# Patient Record
Sex: FEMALE | Race: WHITE | NOT HISPANIC OR LATINO | Employment: OTHER | URBAN - METROPOLITAN AREA
[De-identification: names, ages, dates, MRNs, and addresses within clinical notes are randomized per-mention and may not be internally consistent; named-entity substitution may affect disease eponyms.]

---

## 2017-03-01 ENCOUNTER — HISTORICAL (OUTPATIENT)
Dept: ADMINISTRATIVE | Facility: HOSPITAL | Age: 62
End: 2017-03-01

## 2018-05-15 ENCOUNTER — HISTORICAL (OUTPATIENT)
Dept: ADMINISTRATIVE | Facility: HOSPITAL | Age: 63
End: 2018-05-15

## 2018-05-15 LAB
BUN SERPL-MCNC: 11 MG/DL (ref 7–18)
CALCIUM SERPL-MCNC: 8.8 MG/DL (ref 8.5–10.1)
CHLORIDE SERPL-SCNC: 107 MMOL/L (ref 98–107)
CHOLEST SERPL-MCNC: 150 MG/DL (ref 0–200)
CHOLEST/HDLC SERPL: 3.4 {RATIO} (ref 0–4)
CO2 SERPL-SCNC: 29 MMOL/L (ref 21–32)
CREAT SERPL-MCNC: 0.65 MG/DL (ref 0.55–1.02)
CREAT/UREA NIT SERPL: 16.9
GLUCOSE SERPL-MCNC: 157 MG/DL (ref 74–106)
HDLC SERPL-MCNC: 44 MG/DL (ref 35–60)
LDLC SERPL CALC-MCNC: 55 MG/DL (ref 0–129)
POTASSIUM SERPL-SCNC: 4 MMOL/L (ref 3.5–5.1)
SODIUM SERPL-SCNC: 140 MMOL/L (ref 136–145)
TRIGL SERPL-MCNC: 255 MG/DL (ref 30–150)
VLDLC SERPL CALC-MCNC: 51 MG/DL

## 2019-05-20 ENCOUNTER — HISTORICAL (OUTPATIENT)
Dept: ADMINISTRATIVE | Facility: HOSPITAL | Age: 64
End: 2019-05-20

## 2019-05-20 LAB
ALBUMIN SERPL-MCNC: 3.7 GM/DL (ref 3.4–5)
ALP SERPL-CCNC: 65 UNIT/L (ref 38–126)
ALT SERPL-CCNC: 24 UNIT/L (ref 12–78)
AST SERPL-CCNC: 13 UNIT/L (ref 15–37)
BILIRUB SERPL-MCNC: 0.7 MG/DL (ref 0.2–1)
BILIRUBIN DIRECT+TOT PNL SERPL-MCNC: 0.1 MG/DL (ref 0–0.2)
BILIRUBIN DIRECT+TOT PNL SERPL-MCNC: 0.6 MG/DL (ref 0–0.8)
CHOLEST SERPL-MCNC: 169 MG/DL (ref 0–200)
CHOLEST/HDLC SERPL: 3.3 {RATIO} (ref 0–4)
HDLC SERPL-MCNC: 51 MG/DL (ref 35–60)
LDLC SERPL CALC-MCNC: 51 MG/DL (ref 0–129)
LIVER PROFILE INTERP: ABNORMAL
PROT SERPL-MCNC: 7.3 GM/DL (ref 6.4–8.2)
TRIGL SERPL-MCNC: 336 MG/DL (ref 30–150)
VLDLC SERPL CALC-MCNC: 67 MG/DL

## 2019-07-02 ENCOUNTER — HISTORICAL (OUTPATIENT)
Dept: ADMINISTRATIVE | Facility: HOSPITAL | Age: 64
End: 2019-07-02

## 2019-07-02 LAB
ALBUMIN SERPL-MCNC: 3.8 GM/DL (ref 3.4–5)
ALP SERPL-CCNC: 67 UNIT/L (ref 38–126)
ALT SERPL-CCNC: 26 UNIT/L (ref 12–78)
AST SERPL-CCNC: 12 UNIT/L (ref 15–37)
BILIRUB SERPL-MCNC: 0.5 MG/DL (ref 0.2–1)
BILIRUBIN DIRECT+TOT PNL SERPL-MCNC: 0.2 MG/DL (ref 0–0.5)
BILIRUBIN DIRECT+TOT PNL SERPL-MCNC: 0.3 MG/DL (ref 0–0.8)
CHOLEST SERPL-MCNC: 160 MG/DL (ref 0–200)
CHOLEST/HDLC SERPL: 3.4 {RATIO} (ref 0–4)
HDLC SERPL-MCNC: 47 MG/DL (ref 35–60)
LDLC SERPL CALC-MCNC: 57 MG/DL (ref 0–129)
LIVER PROFILE INTERP: ABNORMAL
PROT SERPL-MCNC: 7.4 GM/DL (ref 6.4–8.2)
TRIGL SERPL-MCNC: 280 MG/DL (ref 30–150)
VLDLC SERPL CALC-MCNC: 56 MG/DL

## 2019-07-03 ENCOUNTER — HISTORICAL (OUTPATIENT)
Dept: RADIOLOGY | Facility: HOSPITAL | Age: 64
End: 2019-07-03

## 2019-07-24 LAB
ABS NEUT (OLG): 5.1 X10(3)/MCL (ref 1.5–6.9)
APPEARANCE, UA: CLEAR
APTT PPP: 25.6 SECOND(S) (ref 25–35)
BILIRUB UR QL STRIP: NEGATIVE
BUN SERPL-MCNC: 19 MG/DL (ref 10–20)
CALCIUM SERPL-MCNC: 9.1 MG/DL (ref 8–10.5)
CHLORIDE SERPL-SCNC: 104 MMOL/L (ref 100–108)
CO2 SERPL-SCNC: 25 MMOL/L (ref 21–35)
COLOR UR: NORMAL
CREAT SERPL-MCNC: 0.71 MG/DL (ref 0.7–1.3)
CREAT/UREA NIT SERPL: 27
CRP SERPL-MCNC: 0.2 MG/DL (ref 0–0.9)
ERYTHROCYTE [DISTWIDTH] IN BLOOD BY AUTOMATED COUNT: 13.1 % (ref 11.5–17)
ERYTHROCYTE [SEDIMENTATION RATE] IN BLOOD: 25 MM/HR (ref 0–20)
GLUCOSE (UA): NEGATIVE
GLUCOSE SERPL-MCNC: 116 MG/DL (ref 75–116)
HCT VFR BLD AUTO: 40.1 % (ref 36–48)
HGB BLD-MCNC: 13.7 GM/DL (ref 12–16)
HGB UR QL STRIP: NEGATIVE
INR PPP: 1 (ref 0–1.2)
KETONES UR QL STRIP: NEGATIVE
LEUKOCYTE ESTERASE UR QL STRIP: NEGATIVE
MCH RBC QN AUTO: 31 PG (ref 27–34)
MCHC RBC AUTO-ENTMCNC: 34 GM/DL (ref 31–36)
MCV RBC AUTO: 90 FL (ref 80–99)
NITRITE UR QL STRIP: NEGATIVE
PH UR STRIP: 5.5 [PH]
PLATELET # BLD AUTO: 230 X10(3)/MCL (ref 140–400)
PMV BLD AUTO: 9.5 FL (ref 6.8–10)
POTASSIUM SERPL-SCNC: 4.2 MMOL/L (ref 3.6–5.2)
PROT UR QL STRIP: NEGATIVE
PROTHROMBIN TIME: 9.9 SECOND(S) (ref 9–12)
RBC # BLD AUTO: 4.48 X10(6)/MCL (ref 4.2–5.4)
SODIUM SERPL-SCNC: 139 MMOL/L (ref 135–145)
SP GR UR STRIP: 1.02
UROBILINOGEN UR STRIP-ACNC: 0.2 EU/DL
WBC # SPEC AUTO: 8.8 X10(3)/MCL (ref 4.5–11.5)

## 2019-07-26 LAB — FINAL CULTURE: NORMAL

## 2019-07-30 LAB
CROSSMATCH INTERPRETATION: NORMAL
GROUP & RH: NORMAL
PRODUCT READY: NORMAL

## 2019-07-31 ENCOUNTER — HISTORICAL (OUTPATIENT)
Dept: MEDSURG UNIT | Facility: HOSPITAL | Age: 64
End: 2019-07-31

## 2019-08-01 LAB
HCT VFR BLD AUTO: 35.3 % (ref 36–48)
HGB BLD-MCNC: 11.8 GM/DL (ref 12–16)

## 2019-08-14 ENCOUNTER — HISTORICAL (OUTPATIENT)
Dept: RADIOLOGY | Facility: HOSPITAL | Age: 64
End: 2019-08-14

## 2020-01-22 ENCOUNTER — HISTORICAL (OUTPATIENT)
Dept: RADIOLOGY | Facility: HOSPITAL | Age: 65
End: 2020-01-22

## 2020-09-10 ENCOUNTER — HISTORICAL (OUTPATIENT)
Dept: ADMINISTRATIVE | Facility: HOSPITAL | Age: 65
End: 2020-09-10

## 2020-09-10 LAB
ALBUMIN SERPL-MCNC: 3.9 GM/DL (ref 3.4–4.8)
ALP SERPL-CCNC: 55 UNIT/L (ref 40–150)
ALT SERPL-CCNC: 37 UNIT/L (ref 0–55)
AST SERPL-CCNC: 22 UNIT/L (ref 5–34)
BILIRUB SERPL-MCNC: 0.5 MG/DL
BILIRUBIN DIRECT+TOT PNL SERPL-MCNC: 0.2 MG/DL (ref 0–0.5)
BILIRUBIN DIRECT+TOT PNL SERPL-MCNC: 0.3 MG/DL (ref 0–0.8)
CHOLEST SERPL-MCNC: 145 MG/DL
CHOLEST/HDLC SERPL: 3 {RATIO} (ref 0–5)
HDLC SERPL-MCNC: 44 MG/DL (ref 35–60)
LDLC SERPL CALC-MCNC: 63 MG/DL (ref 50–140)
LIVER PROFILE INTERP: NORMAL
PROT SERPL-MCNC: 6.9 GM/DL (ref 5.8–7.6)
TRIGL SERPL-MCNC: 191 MG/DL (ref 37–140)
VLDLC SERPL CALC-MCNC: 38 MG/DL

## 2021-09-16 ENCOUNTER — HISTORICAL (OUTPATIENT)
Dept: ADMINISTRATIVE | Facility: HOSPITAL | Age: 66
End: 2021-09-16

## 2021-09-16 LAB
ALBUMIN SERPL-MCNC: 3.7 GM/DL (ref 3.4–4.8)
ALP SERPL-CCNC: 50 UNIT/L (ref 40–150)
ALT SERPL-CCNC: 33 UNIT/L (ref 0–55)
AST SERPL-CCNC: 25 UNIT/L (ref 5–34)
BILIRUB SERPL-MCNC: 0.5 MG/DL
BILIRUBIN DIRECT+TOT PNL SERPL-MCNC: 0.2 MG/DL (ref 0–0.5)
BILIRUBIN DIRECT+TOT PNL SERPL-MCNC: 0.3 MG/DL (ref 0–0.8)
CHOLEST SERPL-MCNC: 139 MG/DL
CHOLEST/HDLC SERPL: 3 {RATIO} (ref 0–5)
HDLC SERPL-MCNC: 47 MG/DL (ref 35–60)
LDLC SERPL CALC-MCNC: 60 MG/DL (ref 50–140)
LIVER PROFILE INTERP: NORMAL
PROT SERPL-MCNC: 7.2 GM/DL (ref 5.8–7.6)
TRIGL SERPL-MCNC: 160 MG/DL (ref 37–140)
VLDLC SERPL CALC-MCNC: 32 MG/DL

## 2022-02-25 ENCOUNTER — HISTORICAL (OUTPATIENT)
Dept: ADMINISTRATIVE | Facility: HOSPITAL | Age: 67
End: 2022-02-25

## 2022-02-25 ENCOUNTER — HISTORICAL (OUTPATIENT)
Dept: RADIOLOGY | Facility: HOSPITAL | Age: 67
End: 2022-02-25

## 2022-04-30 NOTE — DISCHARGE SUMMARY
ADMISSION DIAGNOSIS:  Osteoarthritis, left knee.    DISCHARGE DIAGNOSIS:  Status post total left knee arthroplasty.     Her hospital stay was without complications.  On discharge, her dressing was clean and dry.  Neurovascular status was intact to the toes of the left lower extremity.  No complaint of calf pain.  Her Homans sign was negative.  No chest pain, no dyspnea. No shortness of breath.  She is going to be discharged.  On discharge, her hemoglobin was 11.8, her hematocrit was 35.3.  She is going to be discharged home with home health, home physical therapy, appointment with Dr. Villalobos in 2 weeks.  She is discharged home with PT, weightbearing as tolerated.  Instructions not to remove the dressing until 2 weeks postop.  She is discharged on Ecotrin 325 mg p.o. b.i.d. and ZITA hose for 9 weeks postop for anticoagulation prophylaxis.  She is discharged with appointment to see Dr. Villalobos in 2 weeks.        [facility]/SARAN   DD: 08/01/2019 1440   DT: 08/01/2019 1446  Job # [id number]

## 2022-04-30 NOTE — OP NOTE
PREOPERATIVE DIAGNOSIS:  Osteoarthritis, left knee.    POSTOPERATIVE DIAGNOSIS:  Osteoarthritis, left knee.    PROCEDURE:  Left total knee arthroplasty.    ANESTHESIA:  Spinal.    IMPLANT TYPE:  Attune porous-coated knee, size 6 femur, size 6 tibia, size 12 mm polyethylene insert, nonresurfaced patella.    INDICATION:  The patient is 64 with advanced disease of the knee.  Informed consent was obtained.  Risks of the procedure were explained, not excluding infection, bleeding, pain, scarring, neurovascular injury, possible spinout of the bearing.    DESCRIPTION OF PROCEDURE:  Prepped and draped.  Tourniquet was inflated.  Anterior incision was made.  Medial arthrotomy with a vastus intermedius approach was made.  Patella was everted.  She had advanced disease throughout the knee.  Using __________ instrumentation, distal femoral cut was made.  Sized and subluxed the tibia anteriorly and did a proximal tibial cut.  Generous medial release was necessary.  Next, using the sizer, sized the femur for a 6.  Extension gap was measured and found to be anywhere from a 10 to 12.  15 guide was secured in with a tensionometer set.  Set the rotation of the finishing block distally.  Anterior, posterior, chamber cuts, followed by the chip shot were made.  Next, the tibia was prepared.  Conical impactor, full coverage with the guide plate, 4 peg holes made.  The bone quality was excellent for porous-coated application.  Impacted a porous-coated base plate and polyethylene insert and seated the femoral component.  Full extension could be achieved.  After removing osteophytes posteriorly, she had increased extension gap, so had to upsize to a 12 poly, which was balanced in flexion and extension.  There was no spinout of the bearing.  Patella tracked well.  Pulsavac and closed with interrupted #1 Vicryl, oversewn with a running Stratafix     and a drain.  Subcu 2-0.  Subcuticular with Quill monofilament and Dermabond glue  and an Aquacel dressing.        [facility]/SARAN   DD: 07/31/2019 1054   DT: 07/31/2019 1115  Job # [id number]

## 2022-05-03 NOTE — HISTORICAL [facility] CERNER
This is a historical note converted from Shweta. Formatting and pictures may have been removed.  Please reference Shweta for original formatting and attached multimedia. Chief Complaint  knee pain  History of Present Illness  63yo female admitted by Dr. Villalobos for a left TKR.? she is currently having some N/V likely due to anesthesia.? Hospitalist have been consulted for medical evaluation.? She does have h/o HTN, DM, and hypothyroidism. Currently no fever/chills. NO SOB/chest pain.? PT will be consulted.? NO other issues today.  Review of Systems  ?  ?????Constitutional: ?No fever, No chills, No sweats, No weakness, No fatigue. ?  ?????Eye: ?No double vision, No dry eyes, No photophobia. ?  ?????Ear/Nose/Mouth/Throat: ?No ear pain, No nasal congestion, No sore throat. ?  ?????Respiratory: ?No shortness of breath, No cough, No sputum production, No hemoptysis, No wheezing, No pleuritic pain. ?  ?????Cardiovascular: ?No chest pain, No palpitations, No peripheral edema, No syncope. ?  ?????Gastrointestinal:??+ nausea,?+ vomiting, No diarrhea, No constipation, No heartburn, No abdominal pain. ?  ?????Genitourinary: ?No dysuria, No hematuria, No change in urine stream. ?  ?????Hematology/Lymphatics: ?No anemia, No bruising tendency, No bruise, No hemorrhage, No petechiae. ?  ?????Endocrine: ?No excessive thirst, No polyuria, No cold intolerance. ?  ?????Immunologic: ?No recurrent fevers, No recurrent infections. ?  ?????Musculoskeletal: ?Joint pain, No back pain, No muscle pain, No decreased range of motion. ?  ?????Integumentary: ?No rash, No pruritus, No petechiae, No skin lesion. ?  ?????Neurologic: ?Alert and oriented X4, No abnormal balance, No confusion, No tingling. ?  ?????Psychiatric: ?No anxiety, No depression. ?  Physical Exam  Vitals & Measurements  T:?36.5? ?C (Oral)? TMIN:?36.2? ?C (Temporal Artery)? TMAX:?36.8? ?C (Oral)? HR:?68(Peripheral)? RR:?18? BP:?143/61? SpO2:?92%?  General: ?Alert and oriented,  No acute distress. ?  ??????? ? Appearance: Well nourished. ?  ??????? ? Behavior: Appropriate. ?  ??????? ? Skin: Normal for ethnicity. ?  ?????Eye: ?Pupils are equal, round and reactive to light, Extraocular movements are intact. ?  ?????HENT: ?Normocephalic, Normal hearing, Oral mucosa is moist, No pharyngeal erythema. ?  ?????Neck: ?Supple, Non-tender, No carotid bruit, No jugular venous distention, No lymphadenopathy, No thyromegaly. ?  ?????Respiratory: ?Lungs are clear to auscultation, Breath sounds are equal, No chest wall tenderness. ?  ?????Cardiovascular: ?Normal rate, Regular rhythm, No murmur, No gallop, Good pulses equal in all extremities, Normal peripheral perfusion, No edema. ?  ?????Gastrointestinal: ?Soft, Non-tender, Non-distended, Normal bowel sounds, No organomegaly. ?obese  ?????Genitourinary: ?No costovertebral angle tenderness, No urethral discharge. ?  ?????Lymphatics: ?No lymphadenopathy neck, axilla, groin. ?  ?????Musculoskeletal: ?Normal range of motion, Normal strength, No tenderness, No swelling, Normal gait. ?  ?????Integumentary: ?Warm, Dry, Pink, Intact, No rash. ?  ?????Neurologic: ?Alert, Oriented, Normal sensory, Normal motor function, No focal deficits, Cranial Nerves II-XII are grossly intact. ?  ?????Psychiatric: ?Cooperative, Appropriate mood & affect, Normal judgment. ?  Assessment/Plan  1.?OA (osteoarthritis) of knee?M17.10  2.?Morbid obesity?E66.01  3.?Hypertension?I10  4.?Hyperlipidemia?E78.5  Orders:  hydrALAZINE, 10 mg, form: Injection, IV Push, q2hr PRN for hypertension, first dose 07/31/19 14:08:00 CDT, systolic >160 and diastolic >95  review home meds  follow labs  ISS  DVT prophylaxis  therapy  will follow   Problem List/Past Medical History  Ongoing  Morbid obesity  Historical  Anxiety and depression  Diabetes  hepatitic c  Hyperlipidemia  Hypertension  Hypothyroidism  Mitral valve prolapse  Procedure/Surgical History  Total Knee Arthroplasty (Left)  (07/31/2019)  Debridement (eg, high pressure waterjet with/without suction, sharp selective debridement with scissors, scalpel and forceps), open wound, (eg, fibrin, devitalized epidermis and/or dermis, exudate, debris, biofilm), including topic. (12/05/2012)  Nonexcisional debridement of wound, infection or burn (12/05/2012)  Open robotic assisted procedure (09/11/2012)  Other and unspecified total abdominal hysterectomy (09/11/2012)  Other lysis of peritoneal adhesions (09/11/2012)  Other removal of both ovaries and tubes at same operative episode (09/11/2012)  c section x 3  d and c  Liver biopsy sample  oral surgery  tonsilectomy   Medications  Inpatient  ascorbic acid, 500 mg= 2 tab(s), Oral, At Bedtime  Demerol HCl, 50 mg= 1 mL, IM, q4hr, PRN  Dextrose 50% and Water (50 mL vial/syringe), 12.5 gm= 25 mL, IV Push, Once, PRN  Dextrose 50% and Water (50 mL vial/syringe), 12.5 gm= 25 mL, IV Push, As Directed, PRN  Dextrose 50% and Water (50 mL vial/syringe), 25 gm= 50 mL, IV Push, As Directed, PRN  Dextrose 50% in Water intravenous solution, 12.5 gm= 25 mL, IV Push, As Directed, PRN  Dilaudid 2 mg/mL injectable solution, 0.5 mg= 0.25 mL, IV Push, q15min, PRN  Ecotrin 325 mg oral enteric coated tablet, 325 mg= 1 tab(s), Oral, BID  glucagon, 1 mg= 1 EA, IM, q10min, PRN  glucagon, 1 mg= 1 EA, IM, q10min, PRN  hydrALAZINE (Apresoline) Inj., 10 mg= 0.5 mL, IV Push, q2hr, PRN  insulin lispro, 2-14 units, Subcutaneous, As Directed, PRN  Lactated Ringers Injection intravenous solution 1,000 mL, 1000 mL, IV  morphine 10 mg/mL injectable solution, 10 mg= 1 mL, IM, q4hr, PRN  Narcan, 0.4 mg= 1 mL, IV Push, Once, PRN  Norco 10 mg-325 mg oral tablet, 1 tab(s), Oral, q4hr, PRN  Norco 10 mg-325 mg oral tablet, 1 tab(s), Oral, q4hr, PRN  Norco 7.5 mg-325 mg oral tablet, 1 tab(s), Oral, q3hr, PRN  [id number] 1,000 mL + naloxone 1.2 mg  Nubain, 2.5 mg= 0.25 mL, IV Push, q15min, PRN  Nubain, 5 mg= 0.5 mL, IV Push, q15min, PRN  Nubain, 5  mg= 0.5 mL, IV Push, q4hr, PRN  Promethazine IV Push, 12.5 mg= 0.5 mL, IV Push, q4hr, PRN  Restoril 15 mg oral capsule, 15 mg= 1 cap(s), Oral, At Bedtime, PRN  Senokot S, 2 tab(s), Oral, At Bedtime, PRN  Surfak, 240 mg= 1 cap(s), Oral, qPM  Toradol, 15 mg= 0.5 mL, IV Push, q6hr  Toradol, 30 mg= 1 mL, IV Push, q6hr, PRN  Valium, 5 mg= 1 tab(s), Oral, q6hr, PRN  Vancomycin (for IVPB)  Zofran, 4 mg= 2 mL, IV Push, q4hr, PRN  Zofran, 8 mg= 2 tab(s), Oral, q8hr, PRN  Home  Avapro 300 mg oral tablet, 300 mg= 1 tab(s), Oral, qAM  carvedilol 12.5 mg oral tablet, 12.5 mg= 1 tab(s), Oral, BID  fluoxetine 10 mg oral capsule, 10 mg= 1 cap(s), Oral, qAM  hydrochlorothiazide, 12.5 mg, Oral  levothyroxine 175 mcg (0.175 mg) oral tablet, 175 mcg= 1 tab(s), Oral, qAM  Maximum D3 10,000 intl units oral capsule, 34137 IntUnit= 1 cap(s), Oral, 2x/Wk  metformin 500 mg oral tablet, 500 mg= 1 tab(s), Oral, BID  omega-3 polyunsaturated fatty acids ethyl esters 1000 mg oral capsule, 2000 mg= 2 cap(s), Oral, BID  simvastatin 5 mg oral tablet, 5 mg= 1 tab(s), Oral, Once a day (at bedtime)  Allergies  penicillin?(Anaphylactic reaction)  Social History  Abuse/Neglect  No, 07/31/2019  Alcohol - Low Risk, 09/10/2012  Past, 09/10/2012  Employment/School  Retired, 07/24/2019  Home/Environment  Lives with Spouse., 07/24/2019  Sexual  Sexual orientation: Straight or heterosexual., 07/24/2019  Substance Use - Denies Substance Abuse, 09/10/2012  Denies, 07/24/2019  Tobacco - Denies Tobacco Use, 09/10/2012  Never (less than 100 in lifetime), N/A, 07/31/2019  Family History  Breast cancer: Sister.Negative: Brother.  Breast disease: Sister.Negative: Brother.  Depression.: Mother.  Diabetes mellitus type 2: Mother and Brother.  High blood pressure: Mother.  High cholesterol: Mother.

## 2023-10-05 ENCOUNTER — HOSPITAL ENCOUNTER (EMERGENCY)
Facility: HOSPITAL | Age: 68
Discharge: HOME | End: 2023-10-05
Attending: GENERAL ACUTE CARE HOSPITAL
Payer: MEDICARE

## 2023-10-05 VITALS
WEIGHT: 250 LBS | TEMPERATURE: 97 F | SYSTOLIC BLOOD PRESSURE: 144 MMHG | DIASTOLIC BLOOD PRESSURE: 87 MMHG | HEIGHT: 64 IN | OXYGEN SATURATION: 96 % | HEART RATE: 71 BPM | RESPIRATION RATE: 24 BRPM | BODY MASS INDEX: 42.68 KG/M2

## 2023-10-05 DIAGNOSIS — T78.40XA ALLERGIC REACTION, INITIAL ENCOUNTER: Primary | ICD-10-CM

## 2023-10-05 PROCEDURE — 63600175 PHARM REV CODE 636 W HCPCS: Performed by: GENERAL ACUTE CARE HOSPITAL

## 2023-10-05 PROCEDURE — 96374 THER/PROPH/DIAG INJ IV PUSH: CPT

## 2023-10-05 PROCEDURE — 25000003 PHARM REV CODE 250: Performed by: GENERAL ACUTE CARE HOSPITAL

## 2023-10-05 PROCEDURE — 96375 TX/PRO/DX INJ NEW DRUG ADDON: CPT

## 2023-10-05 PROCEDURE — 99284 EMERGENCY DEPT VISIT MOD MDM: CPT | Mod: 25

## 2023-10-05 RX ORDER — DIPHENHYDRAMINE HYDROCHLORIDE 50 MG/ML
25 INJECTION INTRAMUSCULAR; INTRAVENOUS
Status: COMPLETED | OUTPATIENT
Start: 2023-10-05 | End: 2023-10-05

## 2023-10-05 RX ORDER — METHYLPREDNISOLONE SOD SUCC 125 MG
125 VIAL (EA) INJECTION
Status: COMPLETED | OUTPATIENT
Start: 2023-10-05 | End: 2023-10-05

## 2023-10-05 RX ORDER — HYDROXYZINE HYDROCHLORIDE 25 MG/1
25 TABLET, FILM COATED ORAL 3 TIMES DAILY PRN
Qty: 30 TABLET | Refills: 0 | Status: SHIPPED | OUTPATIENT
Start: 2023-10-05

## 2023-10-05 RX ORDER — FAMOTIDINE 10 MG/ML
20 INJECTION INTRAVENOUS
Status: COMPLETED | OUTPATIENT
Start: 2023-10-05 | End: 2023-10-05

## 2023-10-05 RX ADMIN — METHYLPREDNISOLONE SODIUM SUCCINATE 125 MG: 125 INJECTION INTRAMUSCULAR; INTRAVENOUS at 05:10

## 2023-10-05 RX ADMIN — FAMOTIDINE 20 MG: 10 INJECTION, SOLUTION INTRAVENOUS at 05:10

## 2023-10-05 RX ADMIN — DIPHENHYDRAMINE HYDROCHLORIDE 25 MG: 50 INJECTION INTRAMUSCULAR; INTRAVENOUS at 05:10

## 2023-10-05 NOTE — ED PROVIDER NOTES
Encounter Date: 10/5/2023       History     Chief Complaint   Patient presents with    Allergic Reaction     Pt states started with itching tonight and has scratchy throat. Has happened several times before and states her throats starts swelling shut.     Came in emergency room with chief complaints of a generalized pruritus, some swelling of lives and itching of the throat.  Patient is allergic to penicillin, reports several episodes of generalized allergy similar like today, but never was evaluated and tested by allergy specialist, no difficulty of breathing, no previous history of asthma, contact dermatitis    The history is provided by the patient.     Review of patient's allergies indicates:   Allergen Reactions    Pcn [penicillins] Itching     Past Medical History:   Diagnosis Date    Diabetes mellitus     Hypertension     Unspecified viral hepatitis C without hepatic coma     treated for it     Past Surgical History:   Procedure Laterality Date     SECTION      x 3    HYSTERECTOMY      TONSILLECTOMY       No family history on file.  Social History     Tobacco Use    Smoking status: Never    Smokeless tobacco: Never   Substance Use Topics    Alcohol use: Yes     Comment: occasionally    Drug use: Never     Review of Systems   Skin:  Positive for rash.   All other systems reviewed and are negative.      Physical Exam     Initial Vitals   BP Pulse Resp Temp SpO2   10/05/23 0456 10/05/23 0456 10/05/23 0456 10/05/23 0506 10/05/23 0456   (!) 147/93 81 20 97.2 °F (36.2 °C) 97 %      MAP       --                Physical Exam    Nursing note and vitals reviewed.  Constitutional: She appears well-developed and well-nourished. She is Obese .   HENT:   Right Ear: External ear normal.   Left Ear: External ear normal.   Eyes: Conjunctivae are normal. Pupils are equal, round, and reactive to light.   Neck:   Normal range of motion.  Cardiovascular:  Normal rate and regular rhythm.           Pulmonary/Chest: Breath  sounds normal.   Abdominal: Abdomen is soft. Bowel sounds are normal.   Musculoskeletal:         General: Normal range of motion.      Cervical back: Normal range of motion.     Neurological: She is alert and oriented to person, place, and time. GCS score is 15. GCS eye subscore is 4. GCS verbal subscore is 5. GCS motor subscore is 6.   Skin: Skin is warm. Capillary refill takes 2 to 3 seconds.   Psychiatric: Her behavior is normal. Thought content normal.         ED Course   Procedures  Labs Reviewed - No data to display       Imaging Results    None          Medications   diphenhydrAMINE injection 25 mg (25 mg Intravenous Given 10/5/23 0511)   famotidine (PF) injection 20 mg (20 mg Intravenous Given 10/5/23 0512)   methylPREDNISolone sodium succinate injection 125 mg (125 mg Intravenous Given 10/5/23 0512)     Medical Decision Making  Came in emergency room with chief complaints of a generalized pruritus, some swelling of lives and itching of the throat.  Patient is allergic to penicillin, reports several episodes of generalized allergy similar like today, but never was evaluated and tested by allergy specialist, no difficulty of breathing, no previous history of asthma, contact dermatitis, patient has history of diabetes, patient states that she took 1 pill of Allegra prior of ER    Physical exam revealed no respiratory distress, there is no of this visual angioedema of oropharynx or mucous membrane, normal size of lips, nose, patient has dry skin but there is no hives no urticaria,    Risk  Prescription drug management.               ED Course as of 10/06/23 2306   Thu Oct 05, 2023   1767 Patient's symptoms improved after medication [IP]      ED Course User Index  [IP] Mitra Mora MD                    Clinical Impression:   Final diagnoses:  [T78.40XA] Allergic reaction, initial encounter (Primary)        ED Disposition Condition    Discharge Stable          ED Prescriptions       Medication Sig  Dispense Start Date End Date Auth. Provider    hydrOXYzine HCL (ATARAX) 25 MG tablet Take 1 tablet (25 mg total) by mouth 3 (three) times daily as needed for Itching. 30 tablet 10/5/2023 -- Mitra Mora MD          Follow-up Information    None          Mitra oMra MD  10/06/23 4914